# Patient Record
Sex: FEMALE | Race: OTHER | HISPANIC OR LATINO | URBAN - METROPOLITAN AREA
[De-identification: names, ages, dates, MRNs, and addresses within clinical notes are randomized per-mention and may not be internally consistent; named-entity substitution may affect disease eponyms.]

---

## 2022-11-25 ENCOUNTER — EMERGENCY (EMERGENCY)
Facility: HOSPITAL | Age: 77
LOS: 1 days | Discharge: HOME | End: 2022-11-25
Attending: STUDENT IN AN ORGANIZED HEALTH CARE EDUCATION/TRAINING PROGRAM
Payer: MEDICARE

## 2022-11-25 VITALS
HEART RATE: 90 BPM | HEIGHT: 67 IN | TEMPERATURE: 98 F | OXYGEN SATURATION: 97 % | WEIGHT: 169.09 LBS | RESPIRATION RATE: 18 BRPM | DIASTOLIC BLOOD PRESSURE: 76 MMHG | SYSTOLIC BLOOD PRESSURE: 165 MMHG

## 2022-11-25 PROCEDURE — 73130 X-RAY EXAM OF HAND: CPT

## 2022-11-25 PROCEDURE — 73110 X-RAY EXAM OF WRIST: CPT | Mod: 26,LT

## 2022-11-25 PROCEDURE — 99284 EMERGENCY DEPT VISIT MOD MDM: CPT | Mod: 25

## 2022-11-25 PROCEDURE — 73130 X-RAY EXAM OF HAND: CPT | Mod: 26,LT

## 2022-11-25 PROCEDURE — 73110 X-RAY EXAM OF WRIST: CPT

## 2022-11-25 PROCEDURE — 99284 EMERGENCY DEPT VISIT MOD MDM: CPT

## 2022-11-25 PROCEDURE — 96372 THER/PROPH/DIAG INJ SC/IM: CPT

## 2022-11-25 RX ORDER — ACETAMINOPHEN 500 MG
975 TABLET ORAL ONCE
Refills: 0 | Status: COMPLETED | OUTPATIENT
Start: 2022-11-25 | End: 2022-11-25

## 2022-11-25 RX ORDER — KETOROLAC TROMETHAMINE 30 MG/ML
15 SYRINGE (ML) INJECTION ONCE
Refills: 0 | Status: DISCONTINUED | OUTPATIENT
Start: 2022-11-25 | End: 2022-11-25

## 2022-11-25 RX ADMIN — Medication 15 MILLIGRAM(S): at 20:07

## 2022-11-25 RX ADMIN — Medication 975 MILLIGRAM(S): at 19:07

## 2022-11-25 RX ADMIN — Medication 975 MILLIGRAM(S): at 16:58

## 2022-11-25 RX ADMIN — Medication 15 MILLIGRAM(S): at 20:37

## 2022-11-25 NOTE — ED PROVIDER NOTE - OBJECTIVE STATEMENT
77F PMH HTN, HLD, DM, diabetic neuropathy p/w arm pain. States she woke up this morning with L wrist and L middle finger pain with tingling noted to the finger. Went to [facility] who sent her to ED to eval for acute UE occlusion. No f/c, cp/sob, abd pain, leg swelling. Endorses some LUE swelling by hand/wrist but none noted on exam. Denies any recent trauma to the area

## 2022-11-25 NOTE — ED ADULT NURSE NOTE - CHIEF COMPLAINT QUOTE
left forearm and hand swelling , pt denies any fall or injures, [place] [facility] [place]

## 2022-11-25 NOTE — ED ADULT TRIAGE NOTE - CHIEF COMPLAINT QUOTE
left forearm and hand swelling , pt denies any fall or injures, sent from [facility] [place]

## 2022-11-25 NOTE — ED PROVIDER NOTE - PROGRESS NOTE DETAILS
Elroy PEARSON (PGY-3)  xr showing possible calcific deposits on palmar portion of wrist. explained results to pt and son. instructed to f/u with pcp and ortho. will d/c to home with return precautions

## 2022-11-25 NOTE — ED PROVIDER NOTE - NSFOLLOWUPCLINICS_GEN_ALL_ED_FT
[facility]  Orthopedics  [address] [place] [address]  Phone: [phone]  Fax: [phone]  Follow Up Time: Urgent

## 2022-11-25 NOTE — ED ADULT NURSE NOTE - OBJECTIVE STATEMENT
Patient presents with c/o left wrist and left middle finger pain onset today. Denies any recent fall.

## 2022-11-25 NOTE — ED PROVIDER NOTE - NS ED ROS FT
CONST: no fevers, no chills  ENT: no sore throat  CV: no chest pain, no leg swelling  RESP: no shortness of breath, no cough  ABD: no abdominal pain, no nausea, no vomiting, no diarrhea  : no dysuria, no flank pain, no hematuria  MSK: no back pain, +extremity pain  NEURO: +tingling of L 3rd digit  SKIN:  no rash

## 2022-11-25 NOTE — ED PROVIDER NOTE - NSFOLLOWUPINSTRUCTIONS_ED_ALL_ED_FT
You were evaluated today for wrist pain. Your x-ray showed possible calcific deposits by your wrist    Please take over the counter pain medications such as Tylenol (650mg every 4 hours) for pain     follow up with your primary care doctor and the Orthopedist in 2-3 days for evaluation    Return to the Emergency Department if you have any new or worsening symptoms

## 2022-11-25 NOTE — ED PROVIDER NOTE - CLINICAL SUMMARY MEDICAL DECISION MAKING FREE TEXT BOX
77F PMH HTN, HLD, DM, diabetic neuropathy p/w arm pain. States she woke up this morning with L wrist and L middle finger pain with tingling noted to the finger. 77F PMH HTN, HLD, DM, diabetic neuropathy p/w arm pain. States she woke up this morning with L wrist and L middle finger pain with tingling noted to the finger. VSS in ED. Neurovasculalry intact, lungs ctab, abd NT  Will eval for fracture on xr although unlikely. Carpal tunnel syndrome also possible. Will give pain meds and reassess

## 2022-11-25 NOTE — ED PROVIDER NOTE - PATIENT PORTAL LINK FT
You can access the FollowMyHealth Patient Portal offered by [facility] by registering at the following website: [email or web address]. By joining [employer]’s FollowMyHealth portal, you will also be able to view your health information using other applications (apps) compatible with our system.

## 2022-11-30 ENCOUNTER — APPOINTMENT (OUTPATIENT)
Dept: ORTHOPEDIC SURGERY | Facility: CLINIC | Age: 77
End: 2022-11-30
Payer: MEDICARE

## 2022-11-30 ENCOUNTER — NON-APPOINTMENT (OUTPATIENT)
Age: 77
End: 2022-11-30

## 2022-11-30 VITALS
HEART RATE: 79 BPM | WEIGHT: 169 LBS | HEIGHT: 67 IN | OXYGEN SATURATION: 95 % | BODY MASS INDEX: 26.53 KG/M2 | DIASTOLIC BLOOD PRESSURE: 72 MMHG | SYSTOLIC BLOOD PRESSURE: 147 MMHG

## 2022-11-30 DIAGNOSIS — M79.643 PAIN IN UNSPECIFIED HAND: ICD-10-CM

## 2022-11-30 PROCEDURE — 76942 ECHO GUIDE FOR BIOPSY: CPT

## 2022-11-30 PROCEDURE — 20526 THER INJECTION CARP TUNNEL: CPT

## 2022-11-30 PROCEDURE — 99204 OFFICE O/P NEW MOD 45 MIN: CPT | Mod: 25

## 2022-12-07 ENCOUNTER — APPOINTMENT (OUTPATIENT)
Dept: ORTHOPEDIC SURGERY | Facility: CLINIC | Age: 77
End: 2022-12-07
Payer: MEDICARE

## 2022-12-07 PROCEDURE — 99214 OFFICE O/P EST MOD 30 MIN: CPT

## 2022-12-12 NOTE — HISTORY OF PRESENT ILLNESS
[FreeTextEntry1] : She reports intermittent numbness and tingling in the radial digits. The left hand is worse than the right hand. This has been worsening for months. The pain awakens her at night. Denies any clicking, locking, or triggering of digits. Denies any weakness. Denies any recent trauma. She has worn braces at night in the past, but these do not help the current symptoms. A recent EMG has been performed and the patient has been referred by her neurologist for further discussion.  Of note, the patient has dystrophic calcifications present in the forearm and wrist volarly without report of antecedent injury\par

## 2022-12-12 NOTE — PHYSICAL EXAM
[de-identified] : Patient is alert, oriented and in no acute distress. Affect and general appearance are normal and patient is able to answer questions appropriately. A focused exam of the bilateral upper extremities reveals full motion of the elbow, wrist and fingers. Patient is able to make a tight fist today.  strength is excellent.   Today she has left positive Phalen’s test, positive Durkan carpal compression test and negative Tinel’s test over the median over at the carpal tunnel. Thenar muscular strength and bulk normal. 5 mm two-point discrimination in all fingertips.   Neurologic: Median, ulnar, and radial motor and sensory are intact.  Skin: No cyanosis, clubbing, edema or rashes. Vascular: Radial pulses intact. Lymphatic: No streaking or epitrochlear adenopathy.\par  [de-identified] : XR L Wrist:  3 views from outside hospital demonstrate no fracture nor dislocation but does show dystrophic calcification at the carpal tunnel

## 2022-12-12 NOTE — ASSESSMENT
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

## 2023-01-22 PROBLEM — Z00.00 ENCOUNTER FOR PREVENTIVE HEALTH EXAMINATION: Noted: 2023-01-22

## 2023-01-23 ENCOUNTER — APPOINTMENT (OUTPATIENT)
Dept: MRI IMAGING | Facility: IMAGING CENTER | Age: 78
End: 2023-01-23

## 2023-01-23 ENCOUNTER — OUTPATIENT (OUTPATIENT)
Dept: OUTPATIENT SERVICES | Facility: HOSPITAL | Age: 78
LOS: 1 days | End: 2023-01-23
Payer: MEDICARE

## 2023-01-23 DIAGNOSIS — Z00.00 ENCOUNTER FOR GENERAL ADULT MEDICAL EXAMINATION WITHOUT ABNORMAL FINDINGS: ICD-10-CM

## 2023-01-23 PROCEDURE — 73223 MRI JOINT UPR EXTR W/O&W/DYE: CPT

## 2023-01-23 PROCEDURE — 73220 MRI UPPR EXTREMITY W/O&W/DYE: CPT

## 2023-01-23 PROCEDURE — A9585: CPT

## 2023-02-03 ENCOUNTER — APPOINTMENT (OUTPATIENT)
Dept: ORTHOPEDIC SURGERY | Facility: CLINIC | Age: 78
End: 2023-02-03
Payer: MEDICARE

## 2023-02-03 PROCEDURE — 99214 OFFICE O/P EST MOD 30 MIN: CPT | Mod: 57

## 2023-02-12 NOTE — ASSESSMENT
[FreeTextEntry1] : 77 year-old female with left carpal tunnel syndrome and flexor tenosynovitis\par \par Plan:\par OR for L open carpal tunnel release \par Medical clearance and PSTs

## 2023-02-12 NOTE — PHYSICAL EXAM
[de-identified] : Patient is alert, oriented and in no acute distress. Affect and general appearance are normal and patient is able to answer questions appropriately. A focused exam of the bilateral upper extremities reveals full motion of the elbow, wrist and fingers. Patient is able to make a tight fist today.  strength is excellent.   Today she has left positive Phalen’s test, positive Durkan carpal compression test and negative Tinel’s test over the median over at the carpal tunnel. Thenar muscular strength and bulk normal. 5 mm two-point discrimination in all fingertips.   Neurologic: Median, ulnar, and radial motor and sensory are intact.  Skin: No cyanosis, clubbing, edema or rashes. Vascular: Radial pulses intact. Lymphatic: No streaking or epitrochlear adenopathy.\par  [de-identified] : MR L Wrist: Flexor tenosynovitis at the carpal tunnel without mass

## 2023-02-12 NOTE — HISTORY OF PRESENT ILLNESS
[FreeTextEntry1] : She reports intermittent numbness and tingling in the radial digits. The left hand is worse than the right hand. This has been worsening for months. The pain awakens her at night. Denies any clicking, locking, or triggering of digits. Denies any weakness. Denies any recent trauma. She has worn braces at night in the past, but these do not help the current symptoms. A recent EMG has been performed and the patient has been referred by her neurologist for further discussion.  Of note, the patient has dystrophic calcifications present in the forearm and wrist volarly without report of antecedent injury.\par \par She follows up today for MRI results.  No mass noted at carpal tunnel.\par

## 2024-10-01 NOTE — ED ADULT TRIAGE NOTE - PAIN RATING/NUMBER SCALE (0-10): REST
Physical Therapy Visit    Visit Type: Daily Treatment Note  Visit: 2  Referring Provider: Koko Juares DO  Medical Diagnosis (from order): M75.102, M12.812 - Rotator cuff tear arthropathy, left     SUBJECTIVE                                                                                                               I still have difficulty raising left arm.  But the pain isn't as bad as before.        OBJECTIVE                                                                                                                                      Treatment     Therapeutic Exercise  Supine shoulder flexion/abduction with dowel x20 each  Supine shoulder external rotation red Tband 10x10\" left   Pulleys flexion/scaption x10 each  Shoulder extension Green tubes 3x10  Shoulder rows Red tubes 3x10  Wall shoulder flexion slides x10 left     Provided patient with Red & Green tband for HEP    Manual Therapy   Left glenohumeral posterior/inferior/anterior mobilizations grade I-IV  Left shoulder PROM    Skilled input: verbal instruction/cues    Writer verbally educated and received verbal consent for hand placement, positioning of patient, and techniques to be performed today from patient for hand placement and palpation for techniques and therapist position for techniques as described above and how they are pertinent to the patient's plan of care.  Home Exercise Program  Access Code: [email or web address]  URL: [email or web address]  Date: 10/01/2024  Prepared by: Davian Billingsley    Exercises  - Supine Shoulder Flexion Extension AAROM with Dowel  - 1 x daily - 7 x weekly - 3 sets - 10 reps  - Shoulder Flexion Wall Slide with Towel  - 1 x daily - 7 x weekly - 3 sets - 10 reps  - Shoulder External Rotation with Resistance  - 1 x daily - 7 x weekly - 3 sets - 10 reps  - Standing Shoulder Row with Anchored Resistance  - 1 x daily - 7 x weekly - 3 sets - 10 reps  - Shoulder extension with resistance - Neutral  - 1 x daily - 7  x weekly - 3 sets - 10 reps      ASSESSMENT                                                                                                            Patient demonstrates improved left shoulder ROM after manual treatment.  Updated HEP  to include more upper extremity strengthening exercises - patient verbalized understanding of exercises.    Education:   - Results of above outlined education: Verbalizes understanding    PLAN                                                                                                                           Suggestions for next session as indicated: Progress per plan of care    Goals  Long Term Goals: to be met by end of plan of care  1. Patient will demonstrate towel wall washing above shoulder height for 2 min without reported difficulty to perform tasks such washing a window or mirror.   2. Patient will demonstrate or report donning/doffing full fasten shirt or coat, one arm at a time, including proper alignment and completion of fasteners, without compensation/adaptive technique or extra time without pain/symptoms and with patient reported manageable pain/symptoms of 3/10.  3. Patient will demonstrate or report hair washing/styling for 10 seconds, 5 times, with patient reported manageable pain/symptoms of 3/10 in order to wash hair without compensation or limitation.   4. Quick DASH: Patient will score 25 or lower on The Quick DASH to indicate a decreased level of impairment with lifting, carrying, household and self-care activity. (minimal clinically important difference: 14 of calculated score)      Therapy procedure time and total treatment time can be found documented on the Time Entry flowsheet     10
